# Patient Record
Sex: FEMALE | URBAN - METROPOLITAN AREA
[De-identification: names, ages, dates, MRNs, and addresses within clinical notes are randomized per-mention and may not be internally consistent; named-entity substitution may affect disease eponyms.]

---

## 2019-07-10 ENCOUNTER — OFFICE VISIT (OUTPATIENT)
Dept: URBAN - METROPOLITAN AREA CLINIC 23 | Facility: CLINIC | Age: 74
End: 2019-07-10
Payer: MEDICARE

## 2019-07-10 DIAGNOSIS — H50.15 ALTERNATING EXOTROPIA: Primary | ICD-10-CM

## 2019-07-10 DIAGNOSIS — Z96.1 PRESENCE OF INTRAOCULAR LENS: ICD-10-CM

## 2019-07-10 DIAGNOSIS — H43.812 VITREOUS DEGENERATION, LEFT EYE: ICD-10-CM

## 2019-07-10 PROCEDURE — 99202 OFFICE O/P NEW SF 15 MIN: CPT | Performed by: OPHTHALMOLOGY

## 2019-07-10 NOTE — IMPRESSION/PLAN
Impression: Vitreous degeneration, left eye: H43.812. Condition: established, stable. Plan: Posterior vitreous detachment accounts for the patient's complaints. There is no evidence of retinal pathology. All symptoms and risks of retinal detachment and tears were discussed in detail. Patient instructed to call the office immediately if any symptoms noted.

## 2019-07-10 NOTE — IMPRESSION/PLAN
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 Plan: Discussed diagnosis in detail with patient. Advised patient of condition. No treatment is required at this time. Will continue to observe condition and or symptoms.